# Patient Record
Sex: FEMALE | Race: WHITE | NOT HISPANIC OR LATINO | Employment: UNEMPLOYED | URBAN - METROPOLITAN AREA
[De-identification: names, ages, dates, MRNs, and addresses within clinical notes are randomized per-mention and may not be internally consistent; named-entity substitution may affect disease eponyms.]

---
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
## 2022-02-22 ENCOUNTER — HOSPITAL ENCOUNTER (OUTPATIENT)
Dept: TELEMEDICINE | Facility: OTHER | Age: 22
Discharge: HOME | End: 2022-02-22

## 2022-02-22 PROCEDURE — G0425 INPT/ED TELECONSULT30: HCPCS | Mod: 95,,, | Performed by: PSYCHIATRY & NEUROLOGY

## 2022-02-22 PROCEDURE — G0425 PR INPT TELEHEALTH CONSULT 30M: ICD-10-PCS | Mod: 95,,, | Performed by: PSYCHIATRY & NEUROLOGY

## 2022-02-23 NOTE — CONSULTS
"[facility]  Psychiatry  Telepsychiatry Consult Note    Please see previous notes:    Patient agreeable to consultation via telepsychiatry.    Tele-Consultation from Psychiatry started: 2/22/2022 at 8:25pm  The chief complaint leading to psychiatric consultation is: depression and anxiety  This consultation was requested the Emergency Department attending physician.  The location of the consulting psychiatrist is [place].  The patient location is [facility] - TELEMEDICINE ED [facility]   The patient arrived at the ED at: [facility]    Also present with the patient at the time of the consultation: nobody    Patient Identification:   Mayra Szymanski is a 21 y.o. female.    Patient information was obtained from patient and past medical records.  Patient presented voluntarily to the Emergency Department by private vehicle.    Consults  Teleconsult Time Documentation  Subjective:     History of Present Illness:  20yo F with hx of bipolar disorder with past successful tx with lamital 100mg po daily and abilify 5mg po daily who is off meds presented to the ED. Patient reports the medication helped her mood substantially but could not longer afford the medications and has been off the medications for about 1 year.    Patient reports since running out of meds, she would have 2-3 days of low mood, not sleeping, irritable, intermittent passive SI, low appetite, no change in sex drive, anhedonia, increased anxiety- "mild", low self esteem. Patient reports this episode has lasted for one month so she decided to get help.  No panic attacks.  No AVH  No HI  No delusions  No obsessions   No trauma hx  No eating disorder  Recent stressors- started new full time job 1-2 weeks ago.   This was the extent of patients complaints at this time.  Patient is sexually active. Does not use contraception.  Reviewed labs with staff and pregnancy test negative, no abnormal basic labs including uds " "reported    Psychiatric History:   First diagnosed with bipolar disorder in [facility]  Previous Psychiatric Hospitalizations: Yes   Previous Medication Trials: Yes as noted above  Previous Suicide Attempts: yes  In [place].   History of Violence: no  History of Depression: yes  History of Ashley: yes, hypomania. No sx at present  History of Auditory/Visual Hallucination no  History of Delusions: no  Outpatient psychiatrist (current & past): No    Substance Abuse History:  Tobacco:Yes  Alcohol: No  Illicit Substances:No, used to use speed years ago  Detox/Rehab: No    Legal History: Past charges/incarcerations: No     Family Psychiatric History: bio father- chaney  Sister- bipolar      Social History:  GED. 2yo daughter. Lives with . Works at a [employer]. Denied access to firearms.Parents live in [place]    Psychiatric Mental Status Exam:  Arousal: alert  Sensorium/Orientation: oriented to grossly intact  Behavior/Cooperation: normal, cooperative   Speech: normal tone, normal rate, normal pitch, normal volume  Language: grossly intact  Mood: " depressed "   Affect: constricted  Thought Process: normal and logical  Thought Content:   Auditory hallucinations: NO  Visual hallucinations: NO  Paranoia: NO  Delusions: no     Suicidal ideation: none at time of the interview  Homicidal ideation: NO  Attention/Concentration:  intact  Memory:    Recent:  Intact   Remote: Intact     Fund of Knowledge: Aware of current events   Abstract reasoning: similarities were abstract  Insight: has awareness of illness  Judgment: behavior is adequate to circumstances      Past Medical History: No past medical history on file.   Laboratory Data: Labs Reviewed - No data to display      Allergies:   Review of patient's allergies indicates:  Not on File    Medications in ER: Medications - No data to display    Medications at home: reviewed with patient in the mAR    No new subjective & objective note has been filed under this hospital " service since the last note was generated.      Assessment - Diagnosis - Goals:     Diagnosis/Impression:   Bipolar II- current episode depressed    RF include race, marital status, passive SI, trouble sleeping, and mood disorder, anxiety and PF include attachment to child, help seeking.    Rec:   At this time based on data that was available to me, patient does not appear to be a PEC candidate. Patient does not appear to have active SI/HI or is gravely disabled. Patient does not want to pursue voluntary psychiatric hospitalization at this time after informed consent provided.  Patient contracts for safety and agreed to return to the ED should she develop any active SI or HI. That said, please discharge patient to the community and provide patient with a mental health resource sheet.    -Informed consent provided    Patient would like to restart abilify 5mg po daily targetibg mood. Although she did take lamictal and abilify concurrently in past, she started them at the same time therefore could not say which medication actually helped if not both. Given potential elevated pregnancy risk in the event she takes both medications (versus only taking one agent), she opts to start abilify monotherapy at this time. She understands that one agent may not lead to resolution of sx however she wants lower relative pregnancy risk as she intends to continue having unprotected sex with her BF.  As part of informed consent, I did discuss other options including no tx, ECT, therapy, other medications for bipolar as well. Patient did convey understanding of risks of abilify including worsened psych sx, TD, NMS, metabolic, cardiac and wanted to proceed with tx.      Time with patient: 33      More than 50% of the time was spent counseling/coordinating care    Consulting clinician was informed of the encounter and consult note.    Consultation ended: 2/22/2022 at 9:01pm    Hector Hernandez MD  Psychiatry  [facility]